# Patient Record
Sex: FEMALE
[De-identification: names, ages, dates, MRNs, and addresses within clinical notes are randomized per-mention and may not be internally consistent; named-entity substitution may affect disease eponyms.]

---

## 2023-05-19 ENCOUNTER — NURSE TRIAGE (OUTPATIENT)
Dept: OTHER | Facility: CLINIC | Age: 43
End: 2023-05-19

## 2023-05-20 NOTE — TELEPHONE ENCOUNTER
Location of patient: [facility]    Received call from Jayesh Gottlieb at [facility] with Red Flag Complaint. Arlette Omalley MRN: [id number]    Provider: Kendal Colvin     Subjective: Caller states \"I was in the ER yesterday. Went there because my stomach really hurt. Lower abdominal pain and esophagus burning. The diagnosed me with esophagitis. I got home and all i've had to drink is water. I vomited again when I got home. I had a granola bar and the minute I swallowed it my chest started burning. \"     Current Symptoms: chest burning in between breasts after swallowing, green vomit x1    Associated Symptoms: NA    Pain Severity: 4/10; burning; intermittent    Temperature: unknown     What has been tried: zofran, tums, omeprazole, protonix     Recommended disposition: Go to ED Now    Care advice provided, patient verbalizes understanding; denies any other questions or concerns.     This triage is a result of a call to the [place] [address]    Reason for Disposition   [1] Vomiting AND [2] contains bile (green color)    Protocols used: Abdominal Pain - [facility]

## 2023-05-23 ENCOUNTER — NURSE TRIAGE (OUTPATIENT)
Dept: OTHER | Facility: CLINIC | Age: 43
End: 2023-05-23

## 2023-05-24 NOTE — TELEPHONE ENCOUNTER
Location of patient: [facility] MRN: [id number]    Provider: Dr. Cortney Dumont: Caller states \"seen today and wanting interpretation of what the MD said to her, caller instructed to call back during office hours and speak with MD directly. Caller agreeable\"       This triage is a result of a call to the [place] [id number]    Reason for Disposition   [1] Caller requesting NON-URGENT health information AND [2] PCP's office is the best resource    Protocols used:  Information Only Call - No Triage-ADULT-

## 2023-05-26 ENCOUNTER — NURSE TRIAGE (OUTPATIENT)
Dept: OTHER | Facility: CLINIC | Age: 43
End: 2023-05-26

## 2023-05-26 NOTE — TELEPHONE ENCOUNTER
Location of patient: [facility]    Received call from Robbie Mcnulty at [facility] with Red Flag Complaint. Anabell Hurley MRN: [id number]    Provider: John Artis    Subjective: Caller states \"shortness of breath\"     Current Symptoms: Patient reports shortness of breath for months due to pleural effusion but worsened today. Patient reports dizziness starting today. Felt like she was going to pass out earlier today. Upper abdominal pain radiating to the right. Patient reports being seen in the ED earlier this week for GERD. Reports nausea, no vomiting. Has appointment with PCP scheduled for Tuesday. Pain Severity: 5/10; dull; intermittent    Temperature: denies    What has been tried: none    Recommended disposition: Go to ED Now    Care advice provided, patient verbalizes understanding; denies any other questions or concerns.     Outcome:     No Appointments available, patient referred to Emergency Department      This triage is a result of a call to the [facility] 258      Reason for Disposition   MODERATE difficulty breathing (e.g., speaks in phrases, SOB even at rest, pulse 100-120) of new-onset or worse than normal    Protocols used: Breathing Difficulty-ADULT-[place]

## 2023-05-27 ENCOUNTER — NURSE TRIAGE (OUTPATIENT)
Dept: OTHER | Facility: CLINIC | Age: 43
End: 2023-05-27

## 2023-05-27 NOTE — TELEPHONE ENCOUNTER
Location of patient: [place]    Received call from [facility] with Red Flag Complaint. Michaela Montoya MRN: [id number]      Provider: Parag LEE    Subjective: Caller states \"dizziness, confusion noted by her mother earlier about a couple of hours prior to call\"     Current Symptoms: dizziness/lightheadedness with standing, feeling in a fog while lying down right now and all morning    Associated Symptoms:  dizziness, confusion    Pain Severity:    /10; ;     Temperature:        What has been tried: laying down, resting    Recommended disposition: Call  Now    Care advice provided, patient verbalizes understanding; denies any other questions or concerns. Outcome: Patient agreeable to contact .     This triage is a result of a call to the [facility] 258    Reason for Disposition   Difficult to awaken or acting confused (e.g., disoriented, slurred speech)    Protocols used: Dizziness - Vertigo-ADULT-

## 2023-06-19 ENCOUNTER — NURSE TRIAGE (OUTPATIENT)
Dept: OTHER | Facility: CLINIC | Age: 43
End: 2023-06-19

## 2023-06-19 NOTE — TELEPHONE ENCOUNTER
Location of patient: [place]    Received call from [employer] at [facility] with Red Flag Complaint. Machelle Wen MRN: [id number]    Provider: Kellee LEE     Subjective: Caller states \"I think I have a UTI\"     Current Symptoms: dysuria, frequency, decreased urination. Pt states she tries to urinate but unable at times and feels like bladder is still full. Hematuria    Associated Symptoms: reduced activity, increased wakefulness    Pain Severity: 3/10; burning; constant    Temperature: no fevers     What has been tried: nothing    Recommended disposition: Go to ED/UCC Now (Or to Office with PCP Approval)    Care advice provided, patient verbalizes understanding; denies any other questions or concerns. Outcome: Pt agreeable to go to an [facility] now.      This triage is a result of a call to the [facility]     Reason for Disposition   Unable to urinate (or only a few drops) and bladder feels very full    Protocols used: Urination Pain - Female-ADULT-[place]